# Patient Record
Sex: MALE | Race: WHITE | HISPANIC OR LATINO | Employment: FULL TIME | URBAN - METROPOLITAN AREA
[De-identification: names, ages, dates, MRNs, and addresses within clinical notes are randomized per-mention and may not be internally consistent; named-entity substitution may affect disease eponyms.]

---

## 2021-09-21 ENCOUNTER — NURSE TRIAGE (OUTPATIENT)
Dept: OTHER | Facility: OTHER | Age: 21
End: 2021-09-21

## 2021-09-21 NOTE — TELEPHONE ENCOUNTER
Regarding: covid swab--symptoms--sore throat, headache, stuffy nose, feel warm   ----- Message from Madi Perez sent at 9/21/2021  7:11 PM EDT -----  "Sore throat, stuffy nose, headache, and I feel warm   My nose burns "

## 2022-01-12 ENCOUNTER — HOSPITAL ENCOUNTER (EMERGENCY)
Facility: HOSPITAL | Age: 22
Discharge: HOME | End: 2022-01-12
Attending: EMERGENCY MEDICINE | Admitting: EMERGENCY MEDICINE
Payer: COMMERCIAL

## 2022-01-12 VITALS
TEMPERATURE: 97.9 F | BODY MASS INDEX: 34.15 KG/M2 | RESPIRATION RATE: 18 BRPM | HEIGHT: 64 IN | HEART RATE: 73 BPM | WEIGHT: 200 LBS | DIASTOLIC BLOOD PRESSURE: 76 MMHG | OXYGEN SATURATION: 98 % | SYSTOLIC BLOOD PRESSURE: 142 MMHG

## 2022-01-12 DIAGNOSIS — L60.0 INGROWN TOENAIL OF LEFT FOOT: Primary | ICD-10-CM

## 2022-01-12 PROCEDURE — 99283 EMERGENCY DEPT VISIT LOW MDM: CPT | Performed by: EMERGENCY MEDICINE

## 2022-01-12 PROCEDURE — 99283 EMERGENCY DEPT VISIT LOW MDM: CPT

## 2022-01-12 PROCEDURE — 11750 EXCISION NAIL&NAIL MATRIX: CPT | Performed by: EMERGENCY MEDICINE

## 2022-01-12 RX ORDER — CEFADROXIL 500 MG/1
500 CAPSULE ORAL EVERY 12 HOURS SCHEDULED
Qty: 14 CAPSULE | Refills: 0 | Status: SHIPPED | OUTPATIENT
Start: 2022-01-12 | End: 2022-01-19

## 2022-01-13 NOTE — ED PROVIDER NOTES
History  Chief Complaint   Patient presents with    Toe Pain     Patient has ingrown toenail and is now causing pain and swelling  40-year-old male complains of bilateral great toe ingrown toenails worse recently at left lateral aspect with intermittent bleeding  No fever chills  Mild local swelling  No other complaints      History provided by:  Patient  Toe Pain  Location:  Left 1st toe laterally  Quality:  Sore, swollen  Severity:  Moderate  Onset quality:  Gradual  Timing:  Constant  Progression:  Worsening  Chronicity:  New  Context:  Clipping toenails short, angling  Relieved by:  Nothing  Worsened by:  Palpation  Associated symptoms: no fever        None       History reviewed  No pertinent past medical history  History reviewed  No pertinent surgical history  History reviewed  No pertinent family history  I have reviewed and agree with the history as documented  E-Cigarette/Vaping    E-Cigarette Use Never User      E-Cigarette/Vaping Substances     Social History     Tobacco Use    Smoking status: Never Smoker    Smokeless tobacco: Never Used   Vaping Use    Vaping Use: Never used   Substance Use Topics    Alcohol use: Not Currently    Drug use: Not Currently       Review of Systems   Constitutional: Negative for fever  All other systems reviewed and are negative  Physical Exam  Physical Exam  Vitals and nursing note reviewed  Constitutional:       Comments: Pleasant, comfortable-appearing   Skin:     Comments: Bilateral great toenails trimmed short, angle trimmed bilaterally, both toes  Left great toe lateral aspect with distal granulation tissue, tiny clot, friable, mild local swelling, tender   Neurological:      Mental Status: He is alert           Vital Signs  ED Triage Vitals [01/12/22 2250]   Temperature Pulse Respirations Blood Pressure SpO2   97 9 °F (36 6 °C) 73 18 142/76 98 %      Temp Source Heart Rate Source Patient Position - Orthostatic VS BP Location FiO2 (%) Temporal Monitor Sitting Right arm --      Pain Score       7           Vitals:    01/12/22 2250   BP: 142/76   Pulse: 73   Patient Position - Orthostatic VS: Sitting         Visual Acuity      ED Medications  Medications - No data to display    Diagnostic Studies  Results Reviewed     None                 No orders to display              Procedures  Procedures         ED Course  ED Course as of 01/12/22 2352 Wed Jan 12, 2022   2325 Procedure note:  Left 1st toe digital block using bupivacaine 0 5% 4 mL  Excellent anesthesia, mid lateral 1st toenail bisected and removed without difficulty, cleaned, no active bleeding, dressed with petroleum gauze and Ace wrap, discussed care going forward and voices good understanding, significant other present and supportive                                             MDM    Disposition  Final diagnoses:   Ingrown toenail of left foot     Time reflects when diagnosis was documented in both MDM as applicable and the Disposition within this note     Time User Action Codes Description Comment    1/12/2022 11:26 PM Fabiana Carty Add [L60 0] Ingrown toenail of left foot       ED Disposition     ED Disposition Condition Date/Time Comment    Discharge Stable Wed Jan 12, 2022 11:26 PM Ze Leroy discharge to home/self care  Follow-up Information     Follow up With Specialties Details Why Contact Info    [address] DPM Podiatry   [place] [address]  [address]    [facility]\"" [facility]  [phone]            Discharge Medication List as of 1/12/2022 11:28 PM      START taking these medications    Details   cefadroxil (DURICEF) 500 mg capsule Take 1 capsule (500 mg total) by mouth every 12 (twelve) hours for 7 days, Starting Wed 1/12/2022, Until Wed 1/19/2022, Normal             No discharge procedures on file      PDMP Review     None          ED Provider  Electronically Signed by           [employer] [facility], DO  01/12/22 5476

## 2022-01-13 NOTE — DISCHARGE INSTRUCTIONS
Change dressing daily, clean with soap and water, use bacitracin at exposed nail bed until resolving  Please see podiatrist as soon as possible, call for appointment within next few days

## 2025-03-14 ENCOUNTER — HOSPITAL ENCOUNTER (EMERGENCY)
Facility: HOSPITAL | Age: 25
Discharge: HOME | End: 2025-03-14
Attending: EMERGENCY MEDICINE
Payer: COMMERCIAL

## 2025-03-14 VITALS
OXYGEN SATURATION: 96 % | DIASTOLIC BLOOD PRESSURE: 81 MMHG | HEART RATE: 57 BPM | TEMPERATURE: 97.3 F | RESPIRATION RATE: 15 BRPM | SYSTOLIC BLOOD PRESSURE: 132 MMHG

## 2025-03-14 DIAGNOSIS — S81.819A LEG LACERATION: Primary | ICD-10-CM

## 2025-03-14 PROCEDURE — 12002 RPR S/N/AX/GEN/TRNK2.6-7.5CM: CPT | Performed by: EMERGENCY MEDICINE

## 2025-03-14 PROCEDURE — 99282 EMERGENCY DEPT VISIT SF MDM: CPT

## 2025-03-14 PROCEDURE — 99284 EMERGENCY DEPT VISIT MOD MDM: CPT | Performed by: EMERGENCY MEDICINE

## 2025-03-14 RX ORDER — GINSENG 100 MG
1 CAPSULE ORAL ONCE
Status: COMPLETED | OUTPATIENT
Start: 2025-03-14 | End: 2025-03-14

## 2025-03-14 RX ORDER — GINSENG 100 MG
1 CAPSULE ORAL 2 TIMES DAILY
Qty: 28 G | Refills: 0 | Status: SHIPPED | OUTPATIENT
Start: 2025-03-14

## 2025-03-14 RX ORDER — CEPHALEXIN 500 MG/1
500 CAPSULE ORAL EVERY 8 HOURS SCHEDULED
Qty: 21 CAPSULE | Refills: 0 | Status: SHIPPED | OUTPATIENT
Start: 2025-03-14 | End: 2025-03-21

## 2025-03-14 RX ADMIN — BACITRACIN 1 SMALL APPLICATION: 500 OINTMENT TOPICAL at 02:35

## 2025-03-14 NOTE — ED NOTES
Bacitracin, Telfa dressing applied and secured with coban. Wound care reviewed with patient and spouse. Both verbalized understanding.      Isha Pope RN  03/14/25 [id number]

## 2025-03-14 NOTE — ED PROVIDER NOTES
Time reflects when diagnosis was documented in both MDM as applicable and the Disposition within this note       Time User Action Codes Description Comment    3/14/2025  2:35 AM Ally Márquez Add [J55.942K] Leg laceration           ED Disposition       ED Disposition   Discharge    Condition   Stable    Date/Time   Fri Mar 14, 2025  2:35 AM    Comment   Jone Frost discharge to home/self care.                   Assessment & Plan       Medical Decision Making  Differential diagnosis includes but not limited to: Simple laceration, complex laceration, foreign body    Risk  OTC drugs.  Prescription drug management.             Medications   bacitracin topical ointment 1 small application (1 small application Topical Given 3/14/25 0235)       ED Risk Strat Scores                            SBIRT 22yo+      Flowsheet Row Most Recent Value   Initial Alcohol Screen: US AUDIT-C     1. How often do you have a drink containing alcohol? 0 Filed at: 03/14/2025 0110   2. How many drinks containing alcohol do you have on a typical day you are drinking?  0 Filed at: 03/14/2025 0110   3a. Male UNDER 65: How often do you have five or more drinks on one occasion? 0 Filed at: 03/14/2025 0110   Audit-C Score 0 Filed at: 03/14/2025 0110   MATTEO: How many times in the past year have you...    Used an illegal drug or used a prescription medication for non-medical reasons? Never Filed at: 03/14/2025 0110                            History of Present Illness       Chief Complaint   Patient presents with    Extremity Laceration     Pt cut his right shin on a baseboard heater. Tetanus is UTD per pt.        History reviewed. No pertinent past medical history.   History reviewed. No pertinent surgical history.   History reviewed. No pertinent family history.   Social History     Tobacco Use    Smoking status: Never    Smokeless tobacco: Never   Vaping Use    Vaping status: Never Used   Substance Use Topics    Alcohol use: Not Currently    Drug  use: Not Currently      E-Cigarette/Vaping    E-Cigarette Use Never User       E-Cigarette/Vaping Substances      I have reviewed and agree with the history as documented.     This is a 24-year-old male presenting to the ED for evaluation of laceration to his right shin.  Patient states that  his leg excellently got caught on the baseboard heater.  Patient states his tetanus is up-to-date.  He is able to ambulate and bear full weight.  Bleeding is well-controlled.        Review of Systems   Constitutional:  Negative for chills and fever.   HENT:  Negative for ear pain and sore throat.    Eyes:  Negative for pain and visual disturbance.   Respiratory:  Negative for cough and shortness of breath.    Cardiovascular:  Negative for chest pain and palpitations.   Gastrointestinal:  Negative for abdominal pain and vomiting.   Genitourinary:  Negative for dysuria and hematuria.   Musculoskeletal:  Negative for arthralgias and back pain.   Skin:  Positive for wound. Negative for color change and rash.   Neurological:  Negative for seizures and syncope.   All other systems reviewed and are negative.          Objective       ED Triage Vitals [03/14/25 0106]   Temperature Pulse Blood Pressure Respirations SpO2 Patient Position - Orthostatic VS   (!) 97.3 °F (36.3 °C) 70 135/84 16 100 % Sitting      Temp Source Heart Rate Source BP Location FiO2 (%) Pain Score    Temporal Monitor Right arm -- 8      Vitals      Date and Time Temp Pulse SpO2 Resp BP Pain Score FACES Pain Rating User   03/14/25 0240 -- -- -- -- -- No Pain -- SM   03/14/25 0200 -- 57 96 % 15 132/81 -- --    03/14/25 0106 97.3 °F (36.3 °C) 70 100 % 16 135/84 8 --             Physical Exam  Vitals and nursing note reviewed.   Constitutional:       General: He is not in acute distress.     Appearance: Normal appearance. He is well-developed. He is not ill-appearing.   HENT:      Head: Normocephalic and atraumatic.      Right Ear: External ear normal.      Left  Ear: External ear normal.      Nose: Nose normal.   Eyes:      Extraocular Movements: Extraocular movements intact.      Conjunctiva/sclera: Conjunctivae normal.   Pulmonary:      Effort: Pulmonary effort is normal.   Musculoskeletal:         General: Signs of injury present. No swelling or deformity. Normal range of motion.      Cervical back: Normal range of motion and neck supple.      Right lower leg: No edema.      Left lower leg: No edema.   Skin:     General: Skin is warm and dry.      Capillary Refill: Capillary refill takes less than 2 seconds.      Comments: 3 cm linear laceration to the right shin, no active bleeding   Neurological:      General: No focal deficit present.      Mental Status: He is alert and oriented to person, place, and time. Mental status is at baseline.   Psychiatric:         Mood and Affect: Mood normal.         Results Reviewed       None            No orders to display       Universal Protocol:  Consent: The procedure was performed in an emergent situation. Verbal consent obtained. Written consent not obtained.  Risks and benefits: risks, benefits and alternatives were discussed  Consent given by: patient  Patient identity confirmed: verbally with patient  Laceration repair    Date/Time: 3/14/2025 12:21 AM    Performed by: Ally Márquez DO  Authorized by: Ally Márquez DO  Body area: lower extremity  Location details: right lower leg  Laceration length: 3 cm  Foreign bodies: no foreign bodies  Tendon involvement: none  Nerve involvement: none  Vascular damage: yes  Anesthesia: local infiltration    Anesthesia:  Local Anesthetic: lidocaine 1% without epinephrine  Anesthetic total: 5 mL    Sedation:  Patient sedated: no        Procedure Details:  Preparation: Patient was prepped and draped in the usual sterile fashion.  Irrigation solution: saline  Irrigation method: jet lavage  Amount of cleaning: extensive  Debridement: none  Degree of undermining: none  Skin  closure: 4-0 nylon  Number of sutures: 13  Technique: simple  Approximation: close  Approximation difficulty: simple  Dressing: 4x4 sterile gauze, antibiotic ointment and gauze roll  Patient tolerance: patient tolerated the procedure well with no immediate complications          ED Medication and Procedure Management   None     Discharge Medication List as of 3/14/2025  2:38 AM        START taking these medications    Details   bacitracin topical ointment 500 units/g topical ointment Apply 1 large application topically 2 (two) times a day, Starting Fri 3/14/2025, Normal      cephalexin (KEFLEX) 500 mg capsule Take 1 capsule (500 mg total) by mouth every 8 (eight) hours for 7 days, Starting Fri 3/14/2025, Until Fri 3/21/2025, Normal           No discharge procedures on file.  ED SEPSIS DOCUMENTATION   Time reflects when diagnosis was documented in both MDM as applicable and the Disposition within this note       Time User Action Codes Description Comment    3/14/2025  2:35 AM Ally Márquez Add [S81.819A] Leg laceration                  Ally Márquez DO  03/14/25 0426

## 2025-03-14 NOTE — Clinical Note
Jone Frost was seen and treated in our emergency department on 3/14/2025.                Diagnosis:     Jone  may return to work on return date.    He may return on this date: 03/16/2025         If you have any questions or concerns, please don't hesitate to call.      Ally Márquez, DO    ______________________________           _______________          _______________  Hospital Representative                              Date                                Time